# Patient Record
(demographics unavailable — no encounter records)

---

## 2025-04-21 NOTE — ASSESSMENT
[Vaccines Reviewed] : Immunizations reviewed today. Please see immunization details in the vaccine log within the immunization flowsheet.  [FreeTextEntry1] : Health Care Maintenance - well visit - routine labs ordered - depression screen negative - flu vaccine- 9/2024 - covid vaccines- reports 3 doses, declines further doses - tdap >10 years, recommend booster  - advised to get annual eye exams with ophthalmology, skin exams with dermatology, and dental exams - RTC for CPE in 1 year or sooner prn  HLD -not on meds -check cmp and lipid panel  Prediabetes -check a1c

## 2025-04-21 NOTE — HEALTH RISK ASSESSMENT
[Yes] : Yes [Monthly or less (1 pt)] : Monthly or less (1 point) [1 or 2 (0 pts)] : 1 or 2 (0 points) [Never (0 pts)] : Never (0 points) [No] : In the past 12 months have you used drugs other than those required for medical reasons? No [0] : 2) Feeling down, depressed, or hopeless: Not at all (0) [PHQ-2 Negative - No further assessment needed] : PHQ-2 Negative - No further assessment needed [Never] : Never [HIV test declined] : HIV test declined [Hepatitis C test declined] : Hepatitis C test declined [With Family] : lives with family [Employed] : employed [] :  [# Of Children ___] : has [unfilled] children [Feels Safe at Home] : Feels safe at home [Fully functional (bathing, dressing, toileting, transferring, walking, feeding)] : Fully functional (bathing, dressing, toileting, transferring, walking, feeding) [Fully functional (using the telephone, shopping, preparing meals, housekeeping, doing laundry, using] : Fully functional and needs no help or supervision to perform IADLs (using the telephone, shopping, preparing meals, housekeeping, doing laundry, using transportation, managing medications and managing finances) [Audit-CScore] : 1 [[id number]] : 0 [de-identified] : mom, wife [FreeTextEntry2] : obtaining permits, also works part time at [employer]

## 2025-04-21 NOTE — HISTORY OF PRESENT ILLNESS
[FreeTextEntry1] : cpe [de-identified] : JACKIE ROSS is a 43 year M who presents for CPE PMHx HLD. prediabetes  Feels well overall